# Patient Record
Sex: FEMALE | Race: BLACK OR AFRICAN AMERICAN | NOT HISPANIC OR LATINO | URBAN - NONMETROPOLITAN AREA
[De-identification: names, ages, dates, MRNs, and addresses within clinical notes are randomized per-mention and may not be internally consistent; named-entity substitution may affect disease eponyms.]

---

## 2021-06-23 ENCOUNTER — IMPORTED ENCOUNTER (OUTPATIENT)
Dept: URBAN - NONMETROPOLITAN AREA CLINIC 1 | Facility: CLINIC | Age: 41
End: 2021-06-23

## 2021-06-23 PROBLEM — H52.03: Noted: 2021-06-23

## 2021-06-23 PROBLEM — H52.223: Noted: 2021-06-23

## 2021-06-23 PROCEDURE — S0620 ROUTINE OPHTHALMOLOGICAL EXA: HCPCS

## 2021-06-23 PROCEDURE — 92015 DETERMINE REFRACTIVE STATE: CPT

## 2021-06-23 NOTE — PATIENT DISCUSSION
Hyperopia/Astigmatism OUDiscussed refractive status in detail with patient. New glasses Rx given today. Continue to monitor.

## 2022-04-09 ASSESSMENT — VISUAL ACUITY
OS_CC: 20/20
OD_CC: 20/25-

## 2022-04-09 ASSESSMENT — TONOMETRY
OD_IOP_MMHG: 16
OS_IOP_MMHG: 17

## 2022-04-29 NOTE — PATIENT DISCUSSION
Recommend 2cc's of [employer] ultra(discussed risks and benefits. .. ).
Recommend Bilateral lower lid blepharoplasty trans conj laser (discussed risks and benefits of sx. .. ).
Recommend Bilateral upper lid blepharoplasty with right upper lid one tuck stitch. predeterm (discussed risks and benefits of sx. .. ).
Recommend Full face CO2 laser (discussed risks and benefits of sx. .. ).
Recommend Mini lower lift, post-tragel, SMAS to oral commissures (discussed risks and benefits of sx. .. ).
Recommend Upper lip CO2 laser (discussed risks and benefits of sx. .. ).
ataxia